# Patient Record
Sex: MALE | Race: OTHER | HISPANIC OR LATINO
[De-identification: names, ages, dates, MRNs, and addresses within clinical notes are randomized per-mention and may not be internally consistent; named-entity substitution may affect disease eponyms.]

---

## 2023-01-23 PROBLEM — Z00.00 ENCOUNTER FOR PREVENTIVE HEALTH EXAMINATION: Status: ACTIVE | Noted: 2023-01-23

## 2023-01-26 ENCOUNTER — APPOINTMENT (OUTPATIENT)
Dept: NEUROSURGERY | Facility: CLINIC | Age: 53
End: 2023-01-26
Payer: MEDICARE

## 2023-01-26 VITALS
HEIGHT: 66 IN | HEART RATE: 69 BPM | OXYGEN SATURATION: 100 % | BODY MASS INDEX: 28.28 KG/M2 | WEIGHT: 176 LBS | SYSTOLIC BLOOD PRESSURE: 144 MMHG | DIASTOLIC BLOOD PRESSURE: 99 MMHG | TEMPERATURE: 98 F

## 2023-01-26 DIAGNOSIS — Z82.49 FAMILY HISTORY OF ISCHEMIC HEART DISEASE AND OTHER DISEASES OF THE CIRCULATORY SYSTEM: ICD-10-CM

## 2023-01-26 DIAGNOSIS — Z86.39 PERSONAL HISTORY OF OTHER ENDOCRINE, NUTRITIONAL AND METABOLIC DISEASE: ICD-10-CM

## 2023-01-26 DIAGNOSIS — Z83.3 FAMILY HISTORY OF DIABETES MELLITUS: ICD-10-CM

## 2023-01-26 DIAGNOSIS — M54.50 LOW BACK PAIN, UNSPECIFIED: ICD-10-CM

## 2023-01-26 DIAGNOSIS — Z78.9 OTHER SPECIFIED HEALTH STATUS: ICD-10-CM

## 2023-01-26 DIAGNOSIS — M79.605 LOW BACK PAIN, UNSPECIFIED: ICD-10-CM

## 2023-01-26 DIAGNOSIS — Z86.79 PERSONAL HISTORY OF OTHER DISEASES OF THE CIRCULATORY SYSTEM: ICD-10-CM

## 2023-01-26 DIAGNOSIS — Z87.39 PERSONAL HISTORY OF OTHER DISEASES OF THE MUSCULOSKELETAL SYSTEM AND CONNECTIVE TISSUE: ICD-10-CM

## 2023-01-26 PROCEDURE — 99203 OFFICE O/P NEW LOW 30 MIN: CPT

## 2023-01-26 RX ORDER — DEXAMETHASONE 2 MG/1
2 TABLET ORAL
Refills: 0 | Status: ACTIVE | COMMUNITY

## 2023-01-26 RX ORDER — SITAGLIPTIN AND METFORMIN HYDROCHLORIDE 50; 1000 MG/1; MG/1
50-1000 TABLET, FILM COATED ORAL
Refills: 0 | Status: ACTIVE | COMMUNITY

## 2023-01-26 RX ORDER — LISINOPRIL 20 MG/1
20 TABLET ORAL
Refills: 0 | Status: ACTIVE | COMMUNITY

## 2023-01-26 RX ORDER — CELECOXIB 200 MG/1
200 CAPSULE ORAL
Refills: 0 | Status: ACTIVE | COMMUNITY

## 2023-01-26 RX ORDER — GLIMEPIRIDE 2 MG/1
2 TABLET ORAL
Refills: 0 | Status: DISCONTINUED | COMMUNITY

## 2023-01-26 RX ORDER — ROSUVASTATIN CALCIUM 5 MG/1
5 TABLET, FILM COATED ORAL
Refills: 0 | Status: ACTIVE | COMMUNITY

## 2023-01-26 NOTE — PHYSICAL EXAM
[General Appearance - Alert] : alert [General Appearance - In No Acute Distress] : in no acute distress [Oriented To Time, Place, And Person] : oriented to person, place, and time [Impaired Insight] : insight and judgment were intact [Affect] : the affect was normal [Person] : oriented to person [Place] : oriented to place [Time] : oriented to time [Straight-Leg Raise Test - Left] : straight leg raise of the left leg was positive [Normal] : normal [4] : 4/5 Great Toe Extension (L5) [5] : 5/5 Ankle Plantar Flexion (S1)

## 2023-01-30 NOTE — REVIEW OF SYSTEMS
[Leg Weakness] : leg weakness [Numbness] : numbness [Tingling] : tingling [Abnormal Sensation] : an abnormal sensation [Negative] : Heme/Lymph [de-identified] : LBP

## 2023-01-30 NOTE — HISTORY OF PRESENT ILLNESS
[> 3 months] : more  than 3 months [FreeTextEntry1] : LBP and leg weakness [de-identified] : Mr. Lamar is a 51 y/o, male, with a hx of HTN, HLD, DM II, and chronic low back pain, here to establish care as a new patient. He reports his low back pain has been worsening ovr the years as he used to work in the [employer]. He reports his low back pain radiates down L leg posteriorly to calve and foot. Most of his pain is in his L buttock. He rates his pain a 7/10. He has associated tingling of L leg and foot, along with weakness of LLE. He reports occasional discomfort in his L testicle. Denies any urine incontinence. He has done physical therapy in the past without improvement so d/c tx. He is currently on celebrex. He underwent percutaneous microdiscectomy 11/2009.

## 2023-01-30 NOTE — ASSESSMENT
[FreeTextEntry1] : Mr. Lamar is a 51 y/o, male, with L4-L5 herniation and stenosis with L foot weakness. Left L4-L5 microdiscectomy recommended to improve pain secondary to herniation. Decision for surgery was mutual. Surgical teaching provided. Risks and benefits discussed in detail.Referral for pain management for TFB and physical therapy provided in the meantime. Discussed surgical intervention. Pt will try injection and therapy and f/u in 8 weeks to f/u and plan for possible surgery.

## 2023-01-30 NOTE — RESULTS/DATA
[FreeTextEntry1] : IMPRESSION:\par 1. L4-5: Moderate to severe, left greater than right, neuroforaminal and central canal stenosis.Encroachment and probable impingement on the nerve roots in the left lateral recess are suspected.\par

## 2023-01-31 ENCOUNTER — EMERGENCY (EMERGENCY)
Facility: HOSPITAL | Age: 53
LOS: 1 days | Discharge: HOME | End: 2023-01-31
Attending: EMERGENCY MEDICINE
Payer: COMMERCIAL

## 2023-01-31 VITALS
WEIGHT: 175.93 LBS | TEMPERATURE: 98 F | OXYGEN SATURATION: 98 % | SYSTOLIC BLOOD PRESSURE: 150 MMHG | HEART RATE: 60 BPM | DIASTOLIC BLOOD PRESSURE: 87 MMHG | RESPIRATION RATE: 18 BRPM | HEIGHT: 66 IN

## 2023-01-31 PROCEDURE — 96372 THER/PROPH/DIAG INJ SC/IM: CPT

## 2023-01-31 PROCEDURE — 99284 EMERGENCY DEPT VISIT MOD MDM: CPT

## 2023-01-31 PROCEDURE — 99283 EMERGENCY DEPT VISIT LOW MDM: CPT

## 2023-01-31 RX ORDER — DEXAMETHASONE 0.5 MG/5ML
10 ELIXIR ORAL ONCE
Refills: 0 | Status: COMPLETED | OUTPATIENT
Start: 2023-01-31 | End: 2023-01-31

## 2023-01-31 RX ORDER — OXYCODONE AND ACETAMINOPHEN 5; 325 MG/1; MG/1
1 TABLET ORAL
Qty: 12 | Refills: 0
Start: 2023-01-31 | End: 2023-02-02

## 2023-01-31 RX ORDER — KETOROLAC TROMETHAMINE 30 MG/ML
30 SYRINGE (ML) INJECTION ONCE
Refills: 0 | Status: DISCONTINUED | OUTPATIENT
Start: 2023-01-31 | End: 2023-01-31

## 2023-01-31 RX ADMIN — Medication 10 MILLIGRAM(S): at 12:49

## 2023-01-31 RX ADMIN — Medication 30 MILLIGRAM(S): at 12:49

## 2023-01-31 NOTE — ED PROVIDER NOTE - OBJECTIVE STATEMENT
51 y/o male complaining of back pain. History of back pain. Workup by back surgeon indicates narrowing at the L4-L5 w/ central stenosis. Patient scheduled for surgery on March 15 for microdiscectomy. Here because of worsening pain. PMHx of hypertension, diabetes. No smoking, no drinking, no allergies. No known drug allergies.

## 2023-01-31 NOTE — ED PROVIDER NOTE - CLINICAL SUMMARY MEDICAL DECISION MAKING FREE TEXT BOX
Impression: worsening disc problem.   Will medicate w/ Percocet, Toradol, and give a shot of Decadron, and re-evaluate.

## 2023-01-31 NOTE — ED PROVIDER NOTE - NSFOLLOWUPINSTRUCTIONS_ED_ALL_ED_FT
ArabicBosnianCanadian FrenchEating Recovery Center a [facility] Children and AdolescentslishSaint [facility] CreoleInd[facility] University Health Black[facility] HospitalgalogTraditionHCA [facility] Plant[facility] Emergency                                                                                                       Ciática    Sciatica       La ciática es el dolor, entumecimiento, debilidad u hormigueo a lo lenora del nervio ciático. El nervio ciático comienza en la parte inferior de la espalda y desciende por la parte posterior de cada pierna. Controla los músculos en la parte inferior de las piernas y en la parte posterior de las rodillas. También proporciona sensibilidad a la parte posterior de los muslos, la parte inferior de las piernas y la planta de los pies. La ciática es un síntoma de otra afección que ejerce presión o “pellizca” el nervio ciático.    Con mayor frecuencia, la ciática afecta a un solo lado del cuerpo. Suele desaparecer por sí melanie o con tratamiento. En algunos casos, la ciática puede volver (ser recurrente).      ¿Cuáles son las causas?    Esta afección es causada por poly presión sobre el nervio ciático o “pellizco” del nervio ciático. Jette puede ser el resultado de:  •Un disco que sobresale demasiado entre los huesos de la columna vertebral (hernia de disco).      •Cambios en los discos vertebrales relacionados con la edad.      •Un trastorno doloroso que afecta un músculo de las nalgas.      •Un crecimiento óseo adicional cerca del nervio ciático.      •Poly rotura (fractura) de la pelvis.      •Embarazo.      •Tumor. Jette es poco frecuente.        ¿Qué incrementa el riesgo?    Los siguientes factores pueden hacer que sea más propenso a desarrollar esta afección:  •Practicar deportes que ponen presión o tensión sobre la columna vertebral.      •Tener poca fuerza y flexibilidad.      •Antecedentes de cirugía o lesión en la espalda.      •Estar sentado justin largos períodos de tiempo.      •Realizar actividades que requieren agacharse o levantar objetos en forma repetida.      •Obesidad.        ¿Cuáles son los signos o los síntomas?    Los síntomas pueden ser leves o graves, y pueden incluir los siguientes:•Cualquiera de los siguientes problemas en la parte inferior de la espalda, piernas, cadera o nalgas:  •Hormigueo leve, adormecimiento o dolor sordo.      •Sensación de ardor.      •Dolor kaveh.        •Adormecimiento de la parte posterior de la pantorrilla o la planta del pie.      •Debilidad en las piernas.      •Dolor de espalda intenso que dificulta el movimiento.      Los síntomas podrían empeorar al toser, estornudar o reírse, o cuando se está sentado o de pie justin períodos prolongados.      ¿Cómo se diagnostica?    Esta afección se puede diagnosticar en función de lo siguiente:  •Los síntomas y los antecedentes médicos.      •Un examen físico.      •Análisis de emerita.    •Pruebas de diagnóstico por imágenes, por ejemplo:  •Radiografías.      •Resonancia magnética (RM).      •Exploración por tomografía computarizada (TC).          ¿Cómo se trata?    En muchos casos, esta afección mejora por sí melanie, sin tratamiento. Sin embargo, el tratamiento puede incluir:  •Reducción o modificación la actividad física.      •Ejercicios y estiramientos.      •Aplicación de calor o hielo en la jase afectada.    •Medicamentos para lo siguiente:  •Aliviar el dolor y la inflamación.      •Relajar los músculos.        •Medicamentos inyectables que ayudan a aliviar el dolor, la irritación y la inflamación alrededor del nervio ciático (corticoesteroides).      •Cirugía.        Siga estas instrucciones en sanchez casa:    Medicamentos     •[place] los medicamentos de venta anjelica y los recetados solamente asif se lo haya indicado el médico.    •Pregúntele al médico si el medicamento recetado:  •Hace que sea necesario que evite conducir o usar maquinaria pesada.    •Puede causarle estreñimiento. Es posible que tenga que jelena estas medidas para prevenir o tratar el estreñimiento:  •Beber suficiente líquido asif para mantener la orina de color amarillo pálido.      •Jelena medicamentos recetados o de venta anjelica.      •Consumir alimentos ricos en fibra, asif frijoles, cereales integrales, y frutas y verduras frescas.      •Limitar el consumo de alimentos ricos en grasa y azúcares procesados, asif los alimentos fritos o dulces.            Control del dolor                 •Si se lo indican, aplique hielo sobre la jase afectada.  •Ponga el hielo en poly bolsa plástica.      •Coloque poly toalla entre la piel y la bolsa.      •Coloque el hielo justin 20 minutos, 2 a 3 veces por día.      •Si se lo indican, aplique calor en la jase afectada. Use la david de calor que el médico le recomiende, asif poly compresa de calor húmedo o poly almohadilla térmica.  •Coloque poly toalla entre la piel y la david de calor.      •Aplique calor justin 20 a 30 minutos.      •Retire la david de calor si la piel se pone de color rodriges brillante. Jette es especialmente importante si no puede sentir dolor, calor o frío. Puede correr un riesgo mayor de sufrir quemaduras.          Actividad      •Retome melina actividades normales según lo indicado por el médico. Pregúntele al médico qué actividades son seguras para usted.      •Evite las actividades que empeoran los síntomas.    •Justin el día, descanse justin lapsos breves.  •Cuando descanse justin períodos más largos, incorpore alguna actividad suave o ejercicios de elongación entre los períodos de descanso. Jette ayudará a evitar la rigidez y el dolor.      •Evite estar sentado justin largos períodos de tiempo sin moverse. Levántese y muévase al menos poly vez cada hora.        •Florecita ejercicio y elongue habitualmente, asif se lo haya indicado el médico.      • No levante nada que pese más de 10 libras (4.5 kg) mientras tenga síntomas de ciática. Aunque no tenga síntomas, evite levantar objetos pesados, en especial en forma repetida.    •Siempre use las técnicas de levantamiento correctas para levantar objetos, entre ellas:  •Flexionar las rodillas.      •Mantener la carga cerca del cuerpo.      •No torcerse.        Instrucciones generales     •Mantenga un peso saludable. El exceso de peso ejerce tensión adicional sobre la espalda.      •Use calzado con buen apoyo y cómodo. Evite usar tacones.      •Evite dormir sobre un colchón que sea demasiado blando o demasiado brad. Un colchón que ofrezca un apoyo suficientemente firme para sanchez espalda al dormir puede ayudar a aliviar el dolor.      •Concurra a todas las visitas de seguimiento asif se lo haya indicado el médico. Jette es importante.        Comuníquese con un médico si:  •Tiene un dolor con estas características:  •Lo despierta cuando está dormido.      •Empeora al estar recostado.      •Es peor del que experimentó en el pasado.      •Dura más de 4 semanas.        •Pierde peso de manera inexplicable.        Solicite ayuda inmediatamente si:    •No puede controlar cuándo orinar o defecar (incontinencia).    •Tiene lo siguiente:  •Debilidad que empeora en la parte inferior de la espalda, la pelvis, las nalgas o las piernas.      •Enrojecimiento o inflamación en la espalda.      •Sensación de ardor al orinar.          Resumen    •La ciática es el dolor, entumecimiento, debilidad u hormigueo a lo lenora del nervio ciático.      •Esta afección es causada por poly presión sobre el nervio ciático o “pellizco” del nervio ciático.      •La ciática puede causar dolor, adormecimiento u hormigueo en la parte inferior de la espalda, las piernas, las caderas y las nalgas.      •El tratamiento a menudo incluye reposo, ejercicios, medicamentos y aplicar hielo o calor.      Esta información no tiene asif fin reemplazar el consejo del médico. Asegúrese de hacerle al médico cualquier pregunta que tenga.      Document Revised: 02/12/2020 Document Reviewed: 02/12/2020    Elsevier Patient Education © 2022 Elsevier Inc.

## 2023-01-31 NOTE — ED ADULT NURSE NOTE - NSIMPLEMENTINTERV_GEN_ALL_ED
Implemented All Universal Safety Interventions:  [place] to call system. Call bell, personal items and telephone within reach. Instruct patient to call for assistance. Room bathroom lighting operational. Non-slip footwear when patient is off stretcher. Physically safe environment: no spills, clutter or unnecessary equipment. Stretcher in lowest position, wheels locked, appropriate side rails in place.

## 2023-01-31 NOTE — ED PROVIDER NOTE - PHYSICAL EXAMINATION
Musculoskeletal: tenderness over the left sciatic area and left leg and lower spine; able to walk, reflexes equal and symmetrical, sensation okay

## 2023-01-31 NOTE — ED PROVIDER NOTE - PATIENT PORTAL LINK FT
You can access the FollowMyHealth Patient Portal offered by [facility] by registering at the following website: [email or web address]. By joining [facility]’s FollowMyHealth portal, you will also be able to view your health information using other applications (apps) compatible with our system.

## 2023-01-31 NOTE — ED PROVIDER NOTE - NSICDXPASTMEDICALHX_GEN_ALL_CORE_FT
PAST MEDICAL HISTORY:  Back pain       Diabetes     HTN (hypertension)     Spinal stenosis at L4-L5 level

## 2023-01-31 NOTE — ED ADULT NURSE NOTE - SUICIDE SCREENING DEPRESSION
[Good] : ~his/her~  mood as  good [Never] : Never [Yes] : Yes [No] : In the past 12 months have you used drugs other than those required for medical reasons? No [No falls in past year] : Patient reported no falls in the past year [0] : 2) Feeling down, depressed, or hopeless: Not at all (0) [PHQ-2 Negative - No further assessment needed] : PHQ-2 Negative - No further assessment needed [None] : None [With Significant Other] : lives with significant other [Employed] : employed [College] : College [] :  Negative [Sexually Active] : sexually active [Feels Safe at Home] : Feels safe at home [Fully functional (bathing, dressing, toileting, transferring, walking, feeding)] : Fully functional (bathing, dressing, toileting, transferring, walking, feeding) [Fully functional (using the telephone, shopping, preparing meals, housekeeping, doing laundry, using] : Fully functional and needs no help or supervision to perform IADLs (using the telephone, shopping, preparing meals, housekeeping, doing laundry, using transportation, managing medications and managing finances) [[id number]] : 2 [Reports changes in hearing] : Reports no changes in hearing [Reports changes in vision] : Reports no changes in vision [Reports changes in dental health] : Reports no changes in dental health [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

## 2023-02-22 PROBLEM — M48.061 SPINAL STENOSIS, LUMBAR REGION WITHOUT NEUROGENIC CLAUDICATION: Chronic | Status: ACTIVE | Noted: 2023-01-31

## 2023-02-22 PROBLEM — E11.9 TYPE 2 DIABETES MELLITUS WITHOUT COMPLICATIONS: Chronic | Status: ACTIVE | Noted: 2023-01-31

## 2023-02-22 PROBLEM — I10 ESSENTIAL (PRIMARY) HYPERTENSION: Chronic | Status: ACTIVE | Noted: 2023-01-31

## 2023-03-03 ENCOUNTER — OUTPATIENT (OUTPATIENT)
Dept: OUTPATIENT SERVICES | Facility: HOSPITAL | Age: 53
LOS: 1 days | End: 2023-03-03
Payer: COMMERCIAL

## 2023-03-03 VITALS
HEART RATE: 71 BPM | WEIGHT: 175.93 LBS | HEIGHT: 66 IN | DIASTOLIC BLOOD PRESSURE: 87 MMHG | SYSTOLIC BLOOD PRESSURE: 153 MMHG | TEMPERATURE: 97 F | OXYGEN SATURATION: 99 % | RESPIRATION RATE: 16 BRPM

## 2023-03-03 DIAGNOSIS — M51.26 OTHER INTERVERTEBRAL DISC DISPLACEMENT, LUMBAR REGION: ICD-10-CM

## 2023-03-03 DIAGNOSIS — I10 ESSENTIAL (PRIMARY) HYPERTENSION: ICD-10-CM

## 2023-03-03 DIAGNOSIS — Z98.890 OTHER SPECIFIED POSTPROCEDURAL STATES: Chronic | ICD-10-CM

## 2023-03-03 DIAGNOSIS — Z01.818 ENCOUNTER FOR OTHER PREPROCEDURAL EXAMINATION: ICD-10-CM

## 2023-03-03 DIAGNOSIS — E11.9 TYPE 2 DIABETES MELLITUS WITHOUT COMPLICATIONS: ICD-10-CM

## 2023-03-03 DIAGNOSIS — M54.9 DORSALGIA, UNSPECIFIED: ICD-10-CM

## 2023-03-03 DIAGNOSIS — M48.00 SPINAL STENOSIS, SITE UNSPECIFIED: ICD-10-CM

## 2023-03-03 DIAGNOSIS — M54.16 RADICULOPATHY, LUMBAR REGION: ICD-10-CM

## 2023-03-03 LAB
A1C WITH ESTIMATED AVERAGE GLUCOSE RESULT: 8.2 % — HIGH (ref 4–5.6)
BLD GP AB SCN SERPL QL: SIGNIFICANT CHANGE UP
ESTIMATED AVERAGE GLUCOSE: 189 MG/DL — HIGH (ref 68–114)
MRSA PCR RESULT.: SIGNIFICANT CHANGE UP
S AUREUS DNA NOSE QL NAA+PROBE: SIGNIFICANT CHANGE UP

## 2023-03-03 PROCEDURE — G0463: CPT

## 2023-03-03 NOTE — H&P PST ADULT - HISTORY OF PRESENT ILLNESS
52 y.o male with PM/PSHx of HLD, HTN, DM, and Discectomy 2019.  c/o of Lumbar pain radiating to lower extremities for 2 yrs.   On w/u found to have Spinal stenosis and Intervertebral Disc Displacement.  Presents to presurgical testing for schedule Left L4-L5 Microdiscectomy  on 3/10/23 with Dr Mera.

## 2023-03-03 NOTE — H&P PST ADULT - PROBLEM SELECTOR PLAN 1
Pt schedule for Left L4-L5 Microdiscectomy on 3/10/23 with Dr Mera.     Labs, EKG done by PCP 2/24/23- will f/u report   Pt was seen by PCP for Medical clearance 2/24/23- will f/u report  Covid test schedule for 3/7/23 per pt- will f/u result      Pt was  instructed to stop aspirin/ecotrin and all over the counter medication including vitamins and herbal supplements one week prior to surgery   Instructions given on the use of 4% chlorhexidine wash and Pt verbalized understanding of same   Pt Instructed to have nothing by mouth starting midnight day before surgery  Patient is to expect a phone call day before surgery between the hours of 430- 630pm giving arrival time for surgery   Written and verbal preoperative instructions given to via  # [id number] to patient with understanding verbalized.     Patient today with STOP bang score 4  Low  risk for WILLIAM

## 2023-03-03 NOTE — H&P PST ADULT - ASSESSMENT
52 y.o male with Spinal stenosis and Intervertebral Disc Displacement.  for schedule Left L4-L5 Microdiscectomy on 3/10/23 with Dr Mera.     ELIZABETH WILL 4

## 2023-03-09 ENCOUNTER — TRANSCRIPTION ENCOUNTER (OUTPATIENT)
Age: 53
End: 2023-03-09

## 2023-03-10 ENCOUNTER — TRANSCRIPTION ENCOUNTER (OUTPATIENT)
Age: 53
End: 2023-03-10

## 2023-03-10 ENCOUNTER — APPOINTMENT (OUTPATIENT)
Dept: NEUROSURGERY | Facility: HOSPITAL | Age: 53
End: 2023-03-10

## 2023-03-10 ENCOUNTER — OUTPATIENT (OUTPATIENT)
Dept: OUTPATIENT SERVICES | Facility: HOSPITAL | Age: 53
LOS: 1 days | End: 2023-03-10
Payer: COMMERCIAL

## 2023-03-10 VITALS
DIASTOLIC BLOOD PRESSURE: 89 MMHG | TEMPERATURE: 99 F | SYSTOLIC BLOOD PRESSURE: 156 MMHG | OXYGEN SATURATION: 97 % | HEART RATE: 98 BPM | RESPIRATION RATE: 16 BRPM

## 2023-03-10 VITALS
HEIGHT: 66 IN | TEMPERATURE: 97 F | SYSTOLIC BLOOD PRESSURE: 157 MMHG | OXYGEN SATURATION: 99 % | DIASTOLIC BLOOD PRESSURE: 95 MMHG | HEART RATE: 80 BPM | WEIGHT: 175.93 LBS | RESPIRATION RATE: 16 BRPM

## 2023-03-10 DIAGNOSIS — M48.00 SPINAL STENOSIS, SITE UNSPECIFIED: ICD-10-CM

## 2023-03-10 DIAGNOSIS — Z98.890 OTHER SPECIFIED POSTPROCEDURAL STATES: Chronic | ICD-10-CM

## 2023-03-10 DIAGNOSIS — M51.26 OTHER INTERVERTEBRAL DISC DISPLACEMENT, LUMBAR REGION: ICD-10-CM

## 2023-03-10 DIAGNOSIS — M54.16 RADICULOPATHY, LUMBAR REGION: ICD-10-CM

## 2023-03-10 DIAGNOSIS — M54.9 DORSALGIA, UNSPECIFIED: ICD-10-CM

## 2023-03-10 DIAGNOSIS — E78.5 HYPERLIPIDEMIA, UNSPECIFIED: ICD-10-CM

## 2023-03-10 DIAGNOSIS — E11.9 TYPE 2 DIABETES MELLITUS WITHOUT COMPLICATIONS: ICD-10-CM

## 2023-03-10 DIAGNOSIS — I10 ESSENTIAL (PRIMARY) HYPERTENSION: ICD-10-CM

## 2023-03-10 LAB
BLD GP AB SCN SERPL QL: SIGNIFICANT CHANGE UP
GLUCOSE BLDC GLUCOMTR-MCNC: 154 MG/DL — HIGH (ref 70–99)
GLUCOSE BLDC GLUCOMTR-MCNC: 188 MG/DL — HIGH (ref 70–99)

## 2023-03-10 PROCEDURE — 86850 RBC ANTIBODY SCREEN: CPT

## 2023-03-10 PROCEDURE — 82962 GLUCOSE BLOOD TEST: CPT

## 2023-03-10 PROCEDURE — 86900 BLOOD TYPING SEROLOGIC ABO: CPT

## 2023-03-10 PROCEDURE — 63047 LAM FACETEC & FORAMOT LUMBAR: CPT | Mod: AS

## 2023-03-10 PROCEDURE — C1889: CPT

## 2023-03-10 PROCEDURE — 63030 LAMOT DCMPRN NRV RT 1 LMBR: CPT | Mod: LT

## 2023-03-10 PROCEDURE — 63047 LAM FACETEC & FORAMOT LUMBAR: CPT

## 2023-03-10 PROCEDURE — 63030 LAMOT DCMPRN NRV RT 1 LMBR: CPT

## 2023-03-10 PROCEDURE — 36415 COLL VENOUS BLD VENIPUNCTURE: CPT

## 2023-03-10 PROCEDURE — 76000 FLUOROSCOPY <1 HR PHYS/QHP: CPT

## 2023-03-10 PROCEDURE — 86901 BLOOD TYPING SEROLOGIC RH(D): CPT

## 2023-03-10 DEVICE — BONE WAX 2.5GM: Type: IMPLANTABLE DEVICE | Status: FUNCTIONAL

## 2023-03-10 DEVICE — FLOSEAL FAST PREP 5ML: Type: IMPLANTABLE DEVICE | Status: FUNCTIONAL

## 2023-03-10 DEVICE — SURGIFOAM PAD 8CM X 12.5CM X 2MM (100C): Type: IMPLANTABLE DEVICE | Status: FUNCTIONAL

## 2023-03-10 RX ORDER — METHOCARBAMOL 500 MG/1
1 TABLET, FILM COATED ORAL
Qty: 0 | Refills: 0 | DISCHARGE
Start: 2023-03-10

## 2023-03-10 RX ORDER — CELECOXIB 200 MG/1
1 CAPSULE ORAL
Qty: 0 | Refills: 0 | DISCHARGE

## 2023-03-10 RX ORDER — CELECOXIB 200 MG/1
200 CAPSULE ORAL ONCE
Refills: 0 | Status: COMPLETED | OUTPATIENT
Start: 2023-03-10 | End: 2023-03-10

## 2023-03-10 RX ORDER — ACETAMINOPHEN 500 MG
650 TABLET ORAL EVERY 6 HOURS
Refills: 0 | Status: DISCONTINUED | OUTPATIENT
Start: 2023-03-10 | End: 2023-03-24

## 2023-03-10 RX ORDER — OXYCODONE HYDROCHLORIDE 5 MG/1
1 TABLET ORAL
Qty: 0 | Refills: 0 | DISCHARGE
Start: 2023-03-10

## 2023-03-10 RX ORDER — OXYCODONE AND ACETAMINOPHEN 5; 325 MG/1; MG/1
5-325 TABLET ORAL
Qty: 21 | Refills: 0 | Status: ACTIVE | COMMUNITY
Start: 2023-03-10 | End: 1900-01-01

## 2023-03-10 RX ORDER — LISINOPRIL 2.5 MG/1
1 TABLET ORAL
Qty: 0 | Refills: 0 | DISCHARGE

## 2023-03-10 RX ORDER — METHOCARBAMOL 500 MG/1
750 TABLET, FILM COATED ORAL THREE TIMES A DAY
Refills: 0 | Status: DISCONTINUED | OUTPATIENT
Start: 2023-03-10 | End: 2023-03-24

## 2023-03-10 RX ORDER — ROSUVASTATIN CALCIUM 5 MG/1
1 TABLET ORAL
Qty: 0 | Refills: 0 | DISCHARGE

## 2023-03-10 RX ORDER — SODIUM CHLORIDE 9 MG/ML
3 INJECTION INTRAMUSCULAR; INTRAVENOUS; SUBCUTANEOUS EVERY 8 HOURS
Refills: 0 | Status: DISCONTINUED | OUTPATIENT
Start: 2023-03-10 | End: 2023-03-10

## 2023-03-10 RX ORDER — TRAMADOL HYDROCHLORIDE 50 MG/1
50 TABLET ORAL ONCE
Refills: 0 | Status: DISCONTINUED | OUTPATIENT
Start: 2023-03-10 | End: 2023-03-10

## 2023-03-10 RX ORDER — POLYETHYLENE GLYCOL 3350 17 G/17G
17 POWDER, FOR SOLUTION ORAL DAILY
Qty: 30 | Refills: 0 | Status: ACTIVE | COMMUNITY
Start: 2023-03-10 | End: 1900-01-01

## 2023-03-10 RX ORDER — GLIMEPIRIDE 1 MG
1 TABLET ORAL
Qty: 0 | Refills: 0 | DISCHARGE

## 2023-03-10 RX ORDER — CHLORHEXIDINE GLUCONATE 213 G/1000ML
1 SOLUTION TOPICAL DAILY
Refills: 0 | Status: DISCONTINUED | OUTPATIENT
Start: 2023-03-10 | End: 2023-03-10

## 2023-03-10 RX ORDER — OXYCODONE HYDROCHLORIDE 5 MG/1
5 TABLET ORAL EVERY 6 HOURS
Refills: 0 | Status: COMPLETED | OUTPATIENT
Start: 2023-03-10 | End: 2023-03-17

## 2023-03-10 RX ORDER — CEFAZOLIN SODIUM 1 G
1000 VIAL (EA) INJECTION ONCE
Refills: 0 | Status: COMPLETED | OUTPATIENT
Start: 2023-03-10 | End: 2023-03-10

## 2023-03-10 RX ORDER — SODIUM CHLORIDE 9 MG/ML
1000 INJECTION, SOLUTION INTRAVENOUS
Refills: 0 | Status: DISCONTINUED | OUTPATIENT
Start: 2023-03-10 | End: 2023-03-10

## 2023-03-10 RX ORDER — FENTANYL CITRATE 50 UG/ML
50 INJECTION INTRAVENOUS
Refills: 0 | Status: DISCONTINUED | OUTPATIENT
Start: 2023-03-10 | End: 2023-03-10

## 2023-03-10 RX ORDER — FENTANYL CITRATE 50 UG/ML
25 INJECTION INTRAVENOUS
Refills: 0 | Status: DISCONTINUED | OUTPATIENT
Start: 2023-03-10 | End: 2023-03-10

## 2023-03-10 RX ORDER — SITAGLIPTIN AND METFORMIN HYDROCHLORIDE 500; 50 MG/1; MG/1
1 TABLET, FILM COATED ORAL
Qty: 0 | Refills: 0 | DISCHARGE

## 2023-03-10 RX ADMIN — METHOCARBAMOL 750 MILLIGRAM(S): 500 TABLET, FILM COATED ORAL at 12:36

## 2023-03-10 RX ADMIN — TRAMADOL HYDROCHLORIDE 50 MILLIGRAM(S): 50 TABLET ORAL at 07:13

## 2023-03-10 RX ADMIN — CHLORHEXIDINE GLUCONATE 1 APPLICATION(S): 213 SOLUTION TOPICAL at 07:13

## 2023-03-10 RX ADMIN — SODIUM CHLORIDE 3 MILLILITER(S): 9 INJECTION INTRAMUSCULAR; INTRAVENOUS; SUBCUTANEOUS at 07:11

## 2023-03-10 RX ADMIN — Medication 100 MILLIGRAM(S): at 14:36

## 2023-03-10 RX ADMIN — CELECOXIB 200 MILLIGRAM(S): 200 CAPSULE ORAL at 07:12

## 2023-03-10 NOTE — BRIEF OPERATIVE NOTE - NSICDXBRIEFPREOP_GEN_ALL_CORE_FT
PRE-OP DIAGNOSIS:  Left lumbar radiculopathy 10-Mar-2023 11:28:30  Di Mallorie Arredondo  Lumbar herniated disc 10-Mar-2023 11:28:18  Di Mallorie Arredondo

## 2023-03-10 NOTE — ASU DISCHARGE PLAN (ADULT/PEDIATRIC) - NS DC LANGUAGE RELATIONSHIP TO PATIENT
Refill request: levonorgestrel-ethinyl estradiol 0.1-20 MG-MCG per tablet  Last filled: 03/08/21    FLUoxetine (PROzac) 20 MG capsule  Last filled: 01/19/21    Last office visit: 03/05/21  Future apt: 05/10/21    Dr. Santacruz, please review and address refill request.  
daughter

## 2023-03-10 NOTE — ASU DISCHARGE PLAN (ADULT/PEDIATRIC) - PROVIDER TOKENS
FREE:[LAST:[MALIHA],FIRST:[RYAN],PHONE:[phone]:[phone]   [phone]    -],ADDRESS:[[address] [place] [address]   2ND FLOOR   POST [address] AT 3:30 PM],SCHEDULEDAPPT:[03/21/2023],SCHEDULEDAPPTTIME:[12:00 AM]]

## 2023-03-10 NOTE — ASU PATIENT PROFILE, ADULT - FALL HARM RISK - UNIVERSAL INTERVENTIONS
Bed in lowest position, wheels locked, appropriate side rails in place/Call bell, personal items and telephone in reach/Instruct patient to call for assistance before getting out of bed or chair/Non-slip footwear when patient is out of bed/[place] to call system/Physically safe environment - no spills, clutter or unnecessary equipment/Purposeful Proactive Rounding/Room/bathroom lighting operational, light cord in reach

## 2023-03-10 NOTE — ASU DISCHARGE PLAN (ADULT/PEDIATRIC) - CARE PROVIDER_API CALL
RYAN JETT  [address] [facility]   2ND FLOOR   [address] OP [address] AT 3:30 PM  Phone: [phone]  Fax: (   )    -  Scheduled Appointment: 03/21/2023 12:00 AM

## 2023-03-10 NOTE — BRIEF OPERATIVE NOTE - NSICDXBRIEFPOSTOP_GEN_ALL_CORE_FT
POST-OP DIAGNOSIS:  Lumbar herniated disc 10-Mar-2023 11:29:19  Di Mallorie Arredondo  Left lumbar radiculopathy 10-Mar-2023 11:29:06  Di Mallorie Arredondo

## 2023-03-10 NOTE — BRIEF OPERATIVE NOTE - NSICDXBRIEFPROCEDURE_GEN_ALL_CORE_FT
PROCEDURES:  Lumbar microdiscectomy 10-Mar-2023 11:27:54 LEFT L4-L5 MICRODISCECTOMY WITH LAMINECTOMY Mallorie Del Toro

## 2023-03-10 NOTE — ASU DISCHARGE PLAN (ADULT/PEDIATRIC) - NS DC INTERPRETER YES NO
Please inform patient of normal lab results. Potassium and magnesium normal. Continue working on improving diet as we discussed and stay hydrated.     No active or recent hepatitis A.  No Yes

## 2023-03-21 ENCOUNTER — APPOINTMENT (OUTPATIENT)
Dept: NEUROSURGERY | Facility: CLINIC | Age: 53
End: 2023-03-21
Payer: MEDICARE

## 2023-03-21 VITALS
TEMPERATURE: 97.6 F | DIASTOLIC BLOOD PRESSURE: 78 MMHG | HEIGHT: 66 IN | WEIGHT: 176 LBS | OXYGEN SATURATION: 100 % | HEART RATE: 75 BPM | BODY MASS INDEX: 28.28 KG/M2 | SYSTOLIC BLOOD PRESSURE: 123 MMHG

## 2023-03-21 PROCEDURE — 99024 POSTOP FOLLOW-UP VISIT: CPT

## 2023-03-21 NOTE — PHYSICAL EXAM
[General Appearance - Alert] : alert [General Appearance - In No Acute Distress] : in no acute distress [Oriented To Time, Place, And Person] : oriented to person, place, and time [Impaired Insight] : insight and judgment were intact [Affect] : the affect was normal [Person] : oriented to person [Place] : oriented to place [Time] : oriented to time [Straight-Leg Raise Test - Left] : straight leg raise of the left leg was positive [Normal] : normal [Longitudinal] : longitudinal [Clean] : clean [Dry] : dry [Healing Well] : healing well [Well-Healed] : well-healed [Intact] : intact [Sutures Intact] : closed with intact sutures [No Drainage] : without drainage [Normal Skin] : normal [5] : 5/5 Great Toe Extension (L5)

## 2023-03-21 NOTE — REVIEW OF SYSTEMS
[Leg Weakness] : leg weakness [Numbness] : numbness [Tingling] : tingling [Abnormal Sensation] : an abnormal sensation [Negative] : Heme/Lymph [de-identified] : LBP

## 2023-03-21 NOTE — HISTORY OF PRESENT ILLNESS
[> 3 months] : more  than 3 months [FreeTextEntry1] : LBP and leg weakness [de-identified] : 03/21/23: Mr. Lamar is a 51 y/o, male, here for post op Left L4-L5 microdiscectomy with laminectomy 03/10/23. He reports since surgery his low back pain and left leg pain has much improved. His leg weakness has also improved. He is currently taking Tylenol for his pain. He has been controlling his BS. He is very happy with results. \par \par 01/26/23: Mr. Lamar is a 51 y/o, male, with a hx of HTN, HLD, DM II, and chronic low back pain, here to establish care as a new patient. He reports his low back pain has been worsening ovr the years as he used to work in the [employer]. He reports his low back pain radiates down L leg posteriorly to calve and foot. Most of his pain is in his L buttock. He rates his pain a 7/10. He has associated tingling of L leg and foot, along with weakness of LLE. He reports occasional discomfort in his L testicle. Denies any urine incontinence. He has done physical therapy in the past without improvement so d/c tx. He is currently on celebrex. He underwent percutaneous microdiscectomy 11/2009.

## 2023-03-21 NOTE — ASSESSMENT
[FreeTextEntry1] : Mr. Lamar is a 53 y/o, male, with L4-L5 herniation post op Left L4-L5 microdiscectomy with laminectomy. Pt pain and weakness has much improved. Referral for PT for LE strengthening. Will obtain xray to f/u on surgical intervention. Continue with conservative management, f/u in 8 weeks.

## 2023-05-15 RX ORDER — TIZANIDINE 4 MG/1
4 TABLET ORAL 3 TIMES DAILY
Qty: 90 | Refills: 0 | Status: DISCONTINUED | COMMUNITY
Start: 2023-01-31 | End: 2023-05-15

## 2023-05-16 ENCOUNTER — APPOINTMENT (OUTPATIENT)
Dept: NEUROSURGERY | Facility: CLINIC | Age: 53
End: 2023-05-16
Payer: MEDICARE

## 2023-05-16 VITALS
DIASTOLIC BLOOD PRESSURE: 83 MMHG | SYSTOLIC BLOOD PRESSURE: 131 MMHG | HEART RATE: 70 BPM | WEIGHT: 176 LBS | OXYGEN SATURATION: 99 % | BODY MASS INDEX: 28.28 KG/M2 | HEIGHT: 66 IN | TEMPERATURE: 98 F

## 2023-05-16 PROCEDURE — 99024 POSTOP FOLLOW-UP VISIT: CPT

## 2023-05-16 RX ORDER — TIZANIDINE 4 MG/1
4 TABLET ORAL 3 TIMES DAILY
Qty: 90 | Refills: 0 | Status: ACTIVE | COMMUNITY
Start: 2023-05-16 | End: 1900-01-01

## 2023-05-16 RX ORDER — IBUPROFEN 800 MG/1
800 TABLET, FILM COATED ORAL 3 TIMES DAILY
Qty: 90 | Refills: 0 | Status: ACTIVE | COMMUNITY
Start: 2023-05-16 | End: 1900-01-01

## 2023-05-17 NOTE — ASSESSMENT
[FreeTextEntry1] : He is doing well but he can do better if he stats PT. I will give him a script for physical therapy. I will see him back in 3 months.

## 2023-05-17 NOTE — PHYSICAL EXAM
[General Appearance - Alert] : alert [General Appearance - In No Acute Distress] : in no acute distress [Longitudinal] : longitudinal [Clean] : clean [Dry] : dry [Healing Well] : healing well [Well-Healed] : well-healed [Intact] : intact [Sutures Intact] : closed with intact sutures [No Drainage] : without drainage [Normal Skin] : normal [Oriented To Time, Place, And Person] : oriented to person, place, and time [Impaired Insight] : insight and judgment were intact [Affect] : the affect was normal [Person] : oriented to person [Place] : oriented to place [Time] : oriented to time [Straight-Leg Raise Test - Left] : straight leg raise of the left leg was negative [Straight-Leg Raise Test - Right] : straight leg raise  of the right leg was negative [Normal] : normal [5] : 5/5 Ankle Plantar Flexion (S1)

## 2023-05-17 NOTE — HISTORY OF PRESENT ILLNESS
[> 3 months] : more  than 3 months [FreeTextEntry1] : LBP and leg weakness [de-identified] : 5/17/23\par He is here for second follow up after surgery. His leg symptoms are gone but he still has a lot to low back pain. He has not done PT because he is reluctant It may hurt. He has numbness of the legs, no tingling and no nivia or bladder in continence.\par \par 03/21/23: Mr. Lamar is a 51 y/o, male, here for post op Left L4-L5 microdiscectomy with laminectomy 03/10/23. He reports since surgery his low back pain and left leg pain has much improved. His leg weakness has also improved. He is currently taking Tylenol for his pain. He has been controlling his BS. He is very happy with results. \par \par 01/26/23: Mr. Lamar is a 51 y/o, male, with a hx of HTN, HLD, DM II, and chronic low back pain, here to establish care as a new patient. He reports his low back pain has been worsening ovr the years as he used to work in the [employer]. He reports his low back pain radiates down L leg posteriorly to calve and foot. Most of his pain is in his L buttock. He rates his pain a 7/10. He has associated tingling of L leg and foot, along with weakness of LLE. He reports occasional discomfort in his L testicle. Denies any urine incontinence. He has done physical therapy in the past without improvement so d/c tx. He is currently on celebrex. He underwent percutaneous microdiscectomy 11/2009.

## 2023-07-12 RX ORDER — TIZANIDINE 4 MG/1
4 TABLET ORAL 3 TIMES DAILY
Qty: 90 | Refills: 0 | Status: DISCONTINUED | COMMUNITY
Start: 2023-03-10 | End: 2023-07-12

## 2023-07-13 ENCOUNTER — APPOINTMENT (OUTPATIENT)
Dept: NEUROSURGERY | Facility: CLINIC | Age: 53
End: 2023-07-13
Payer: MEDICARE

## 2023-07-13 VITALS
DIASTOLIC BLOOD PRESSURE: 90 MMHG | SYSTOLIC BLOOD PRESSURE: 138 MMHG | HEIGHT: 66 IN | WEIGHT: 176 LBS | BODY MASS INDEX: 28.28 KG/M2 | HEART RATE: 66 BPM | OXYGEN SATURATION: 100 % | TEMPERATURE: 97.5 F

## 2023-07-13 PROCEDURE — 99213 OFFICE O/P EST LOW 20 MIN: CPT

## 2023-07-18 NOTE — REVIEW OF SYSTEMS
[Tingling] : tingling [Abnormal Sensation] : an abnormal sensation [Negative] : Heme/Lymph [de-identified] : LBP

## 2023-07-18 NOTE — PHYSICAL EXAM
[General Appearance - Alert] : alert [General Appearance - In No Acute Distress] : in no acute distress [Longitudinal] : longitudinal [Clean] : clean [Dry] : dry [Healing Well] : healing well [Well-Healed] : well-healed [Intact] : intact [Sutures Intact] : closed with intact sutures [No Drainage] : without drainage [Normal Skin] : normal [Oriented To Time, Place, And Person] : oriented to person, place, and time [Impaired Insight] : insight and judgment were intact [Affect] : the affect was normal [Person] : oriented to person [Place] : oriented to place [Time] : oriented to time [Normal] : normal [5] : 5/5 Ankle Plantar Flexion (S1) [Straight-Leg Raise Test - Left] : straight leg raise of the left leg was negative [Straight-Leg Raise Test - Right] : straight leg raise  of the right leg was negative

## 2023-07-18 NOTE — ASSESSMENT
[FreeTextEntry1] : Mr. Lamar is a 52 y/o, male, post op L L4-L5 microdiscectomy/laminectomy, his leg pain has much improved post op. He continues to have low back pain and leg tingling however, recommend he continue physical therapy x 6 weeks for strengthening and massage. Pt to f/u in 6 months for re-evaluation.

## 2023-07-18 NOTE — HISTORY OF PRESENT ILLNESS
[> 3 months] : more  than 3 months [FreeTextEntry1] : LBP and leg weakness [de-identified] : 07/13/23: Mr. Lamar is a 54 y/o, male, post op L L4-L5 microdiscectomy with laminectomy 03/23. Overall his leg pain has much improved however, he continues to have low back pain. He just started physical therapy last week and has undergone x 2 sessions. He is taking ibuprofen 800 mg TID for his symptoms. No bowel/ bladder dysfunction reported. \par \par 5/17/23\par He is here for second follow up after surgery. His leg symptoms are gone but he still has a lot to low back pain. He has not done PT because he is reluctant It may hurt. He has numbness of the legs, no tingling and no nivia or bladder in continence.\par \par 03/21/23: Mr. Lamar is a 53 y/o, male, here for post op Left L4-L5 microdiscectomy with laminectomy 03/10/23. He reports since surgery his low back pain and left leg pain has much improved. His leg weakness has also improved. He is currently taking Tylenol for his pain. He has been controlling his BS. He is very happy with results. \par \par 01/26/23: Mr. Lamar is a 53 y/o, male, with a hx of HTN, HLD, DM II, and chronic low back pain, here to establish care as a new patient. 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 He reports his low back pain radiates down L leg posteriorly to calve and foot. Most of his pain is in his L buttock. He rates his pain a 7/10. He has associated tingling of L leg and foot, along with weakness of LLE. He reports occasional discomfort in his L testicle. Denies any urine incontinence. He has done physical therapy in the past without improvement so d/c tx. He is currently on celebrex. He underwent percutaneous microdiscectomy 11/2009.

## 2024-01-11 ENCOUNTER — APPOINTMENT (OUTPATIENT)
Dept: NEUROSURGERY | Facility: CLINIC | Age: 54
End: 2024-01-11
Payer: MEDICARE

## 2024-01-11 VITALS
WEIGHT: 180 LBS | DIASTOLIC BLOOD PRESSURE: 88 MMHG | BODY MASS INDEX: 28.93 KG/M2 | HEIGHT: 66 IN | SYSTOLIC BLOOD PRESSURE: 145 MMHG | HEART RATE: 77 BPM | OXYGEN SATURATION: 100 % | TEMPERATURE: 97.8 F

## 2024-01-11 VITALS — DIASTOLIC BLOOD PRESSURE: 85 MMHG | SYSTOLIC BLOOD PRESSURE: 134 MMHG

## 2024-01-11 DIAGNOSIS — M54.9 DORSALGIA, UNSPECIFIED: ICD-10-CM

## 2024-01-11 DIAGNOSIS — M54.16 RADICULOPATHY, LUMBAR REGION: ICD-10-CM

## 2024-01-11 DIAGNOSIS — M51.26 OTHER INTERVERTEBRAL DISC DISPLACEMENT, LUMBAR REGION: ICD-10-CM

## 2024-01-11 DIAGNOSIS — M48.00 SPINAL STENOSIS, SITE UNSPECIFIED: ICD-10-CM

## 2024-01-11 PROCEDURE — 99213 OFFICE O/P EST LOW 20 MIN: CPT

## 2024-01-12 NOTE — HISTORY OF PRESENT ILLNESS
[> 3 months] : more  than 3 months [FreeTextEntry1] : LBP and leg weakness [de-identified] : 01/11/24: Mr. Lamar is a 54 y/o, male, post op L L4-L5 microdiscectomy/laminectomy 03/10/23. Since surgery overall his left leg pain has much improved. However, he still experiences difficulty performing certain activities such as cleaning long periods around his home. His low back pain is usually a 5/10, but can become a 7/10 with activity. When so he takes nsaid to alleviate symptoms. He attempted PT, but d/c therapy as it worsened his pain.  No bowel/bladder dysfunction.      07/13/23: Mr. Lamar is a 54 y/o, male, post op L L4-L5 microdiscectomy with laminectomy 03/23. Overall his leg pain has much improved however, he continues to have low back pain. He just started physical therapy last week and has undergone x 2 sessions. He is taking ibuprofen 800 mg TID for his symptoms. No bowel/ bladder dysfunction reported.   5/17/23 He is here for second follow up after surgery. His leg symptoms are gone but he still has a lot to low back pain. He has not done PT because he is reluctant It may hurt. He has numbness of the legs, no tingling and no nivia or bladder in continence.  03/21/23: Mr. Lamar is a 51 y/o, male, here for post op Left L4-L5 microdiscectomy with laminectomy 03/10/23. He reports since surgery his low back pain and left leg pain has much improved. His leg weakness has also improved. He is currently taking Tylenol for his pain. He has been controlling his BS. He is very happy with results.   01/26/23: Mr. Lamar is a 51 y/o, male, with a hx of HTN, HLD, DM II, and chronic low back pain, here to establish care as a new patient. He reports his low back pain has been worsening ovr the years as he used to work in the [employer]. He reports his low back pain radiates down L leg posteriorly to calve and foot. Most of his pain is in his L buttock. He rates his pain a 7/10. He has associated tingling of L leg and foot, along with weakness of LLE. He reports occasional discomfort in his L testicle. Denies any urine incontinence. He has done physical therapy in the past without improvement so d/c tx. He is currently on celebrex. He underwent percutaneous microdiscectomy 11/2009.

## 2024-01-12 NOTE — REVIEW OF SYSTEMS
[Tingling] : tingling [Abnormal Sensation] : an abnormal sensation [Negative] : Heme/Lymph [de-identified] : LBP

## 2024-01-12 NOTE — ASSESSMENT
[FreeTextEntry1] : Mr. Lamar is a 52 y/o, male, post op L L4-L5 microdiscectomy/laminectomy 03/10/23. His leg pain has overall much improved since surgery. Continue with conservative management. PT exercises to perform at home provided.

## 2025-01-30 ENCOUNTER — APPOINTMENT (OUTPATIENT)
Dept: NEUROSURGERY | Facility: CLINIC | Age: 55
End: 2025-01-30

## 2025-01-30 VITALS
WEIGHT: 195 LBS | DIASTOLIC BLOOD PRESSURE: 83 MMHG | TEMPERATURE: 78.7 F | BODY MASS INDEX: 31.34 KG/M2 | HEIGHT: 66 IN | OXYGEN SATURATION: 100 % | HEART RATE: 71 BPM | SYSTOLIC BLOOD PRESSURE: 127 MMHG

## 2025-01-30 DIAGNOSIS — M51.26 OTHER INTERVERTEBRAL DISC DISPLACEMENT, LUMBAR REGION: ICD-10-CM

## 2025-01-30 DIAGNOSIS — M54.9 DORSALGIA, UNSPECIFIED: ICD-10-CM

## 2025-01-30 PROCEDURE — 99213 OFFICE O/P EST LOW 20 MIN: CPT

## 2025-02-11 ENCOUNTER — APPOINTMENT (OUTPATIENT)
Dept: RADIOLOGY | Facility: CLINIC | Age: 55
End: 2025-02-11
Payer: MEDICARE

## 2025-02-11 ENCOUNTER — APPOINTMENT (OUTPATIENT)
Dept: MRI IMAGING | Facility: CLINIC | Age: 55
End: 2025-02-11
Payer: MEDICARE

## 2025-02-11 PROCEDURE — 72110 X-RAY EXAM L-2 SPINE 4/>VWS: CPT

## 2025-02-11 PROCEDURE — 72148 MRI LUMBAR SPINE W/O DYE: CPT

## 2025-03-06 ENCOUNTER — APPOINTMENT (OUTPATIENT)
Dept: NEUROSURGERY | Facility: CLINIC | Age: 55
End: 2025-03-06
Payer: MEDICARE

## 2025-03-06 VITALS
BODY MASS INDEX: 31.34 KG/M2 | WEIGHT: 195 LBS | HEIGHT: 66 IN | OXYGEN SATURATION: 99 % | DIASTOLIC BLOOD PRESSURE: 92 MMHG | SYSTOLIC BLOOD PRESSURE: 150 MMHG | HEART RATE: 72 BPM | TEMPERATURE: 97.1 F

## 2025-03-06 DIAGNOSIS — M48.00 SPINAL STENOSIS, SITE UNSPECIFIED: ICD-10-CM

## 2025-03-06 DIAGNOSIS — M54.16 RADICULOPATHY, LUMBAR REGION: ICD-10-CM

## 2025-03-06 PROCEDURE — 99214 OFFICE O/P EST MOD 30 MIN: CPT

## 2025-04-02 NOTE — ED ADULT TRIAGE NOTE - HAVE YOU HAD COVID IN THE LAST 60 DAYS?

## (undated) DEVICE — SUT BIOSYN 4-0 18" P-12

## (undated) DEVICE — SUT POLYSORB 0 30" GU-46

## (undated) DEVICE — GOWN XL

## (undated) DEVICE — STAPLER SKIN VISI-STAT 35 WIDE

## (undated) DEVICE — DRAPE C ARM UNIVERSAL

## (undated) DEVICE — DRAPE SURGICAL #1010

## (undated) DEVICE — DRAPE C ARM BANDED SHOWER BAG

## (undated) DEVICE — INSTRUMENT 2 LITE

## (undated) DEVICE — DRSG 2X2

## (undated) DEVICE — DRAPE 1/2 SHEET 40X57"

## (undated) DEVICE — DRAPE LIGHT HANDLE COVER (BLUE)

## (undated) DEVICE — NDL SPINAL 18G X 3.5" (PINK)

## (undated) DEVICE — MIDAS REX MR8 MATCH HEAD FLUTED SM BORE 3MM X 10CM

## (undated) DEVICE — BIPOLAR FORCEP HENSLER BAYONET 10" X 1MM (YELLOW)

## (undated) DEVICE — MIDAS REX MR8 MATCH HEAD FLUTED LG BORE 3MM X 14CM

## (undated) DEVICE — ELCTR BIPOLAR CORD GYRUS 12FT DISP

## (undated) DEVICE — PACK LUMBAR LAMI

## (undated) DEVICE — DRSG STERISTRIPS 0.5 X 4"

## (undated) DEVICE — FOLEY TRAY 16FR 5CC LF UMETER CLOSED

## (undated) DEVICE — DRAPE LAPAROTOMY W POUCHES

## (undated) DEVICE — DRAPE MAGNETIC INSTRUMENT MEDIUM

## (undated) DEVICE — DRAPE TOWEL BLUE 17" X 24"

## (undated) DEVICE — DRSG MASTISOL

## (undated) DEVICE — SUT POLYSORB 3-0 18" V20 (POP-OFF)

## (undated) DEVICE — FOR-ESU VALLEYLAB T7E14842DX: Type: DURABLE MEDICAL EQUIPMENT

## (undated) DEVICE — DRAPE MICROSCOPE LEICA 26" X 150"

## (undated) DEVICE — BASIN SET SINGLE